# Patient Record
Sex: MALE | Race: WHITE | Employment: OTHER | URBAN - METROPOLITAN AREA
[De-identification: names, ages, dates, MRNs, and addresses within clinical notes are randomized per-mention and may not be internally consistent; named-entity substitution may affect disease eponyms.]

---

## 2021-03-03 ENCOUNTER — IMMUNIZATION (OUTPATIENT)
Dept: PRIMARY CARE CLINIC | Age: 71
End: 2021-03-03
Payer: MEDICARE

## 2021-03-03 PROCEDURE — 0001A COVID-19, PFIZER VACCINE 30MCG/0.3ML DOSE: CPT | Performed by: NURSE PRACTITIONER

## 2021-03-03 PROCEDURE — 91300 COVID-19, PFIZER VACCINE 30MCG/0.3ML DOSE: CPT | Performed by: NURSE PRACTITIONER

## 2021-03-29 ENCOUNTER — IMMUNIZATION (OUTPATIENT)
Dept: PRIMARY CARE CLINIC | Age: 71
End: 2021-03-29
Payer: MEDICARE

## 2021-03-29 PROCEDURE — 0002A COVID-19, PFIZER VACCINE 30MCG/0.3ML DOSE: CPT | Performed by: NURSE PRACTITIONER

## 2021-03-29 PROCEDURE — 91300 COVID-19, PFIZER VACCINE 30MCG/0.3ML DOSE: CPT | Performed by: NURSE PRACTITIONER

## 2022-03-11 RX ORDER — LOSARTAN POTASSIUM 100 MG/1
100 TABLET ORAL NIGHTLY
COMMUNITY

## 2022-03-16 ENCOUNTER — ANESTHESIA EVENT (OUTPATIENT)
Dept: OPERATING ROOM | Age: 72
End: 2022-03-16
Payer: MEDICARE

## 2022-03-16 ENCOUNTER — PREP FOR PROCEDURE (OUTPATIENT)
Dept: OPHTHALMOLOGY | Age: 72
End: 2022-03-16

## 2022-03-16 RX ORDER — SODIUM CHLORIDE 0.9 % (FLUSH) 0.9 %
10 SYRINGE (ML) INJECTION PRN
Status: CANCELLED | OUTPATIENT
Start: 2022-03-16

## 2022-03-16 RX ORDER — SODIUM CHLORIDE 9 MG/ML
25 INJECTION, SOLUTION INTRAVENOUS PRN
Status: CANCELLED | OUTPATIENT
Start: 2022-03-16

## 2022-03-16 RX ORDER — PROPARACAINE HYDROCHLORIDE 5 MG/ML
1 SOLUTION/ DROPS OPHTHALMIC SEE ADMIN INSTRUCTIONS
Status: CANCELLED | OUTPATIENT
Start: 2022-03-18

## 2022-03-16 RX ORDER — SODIUM CHLORIDE 9 MG/ML
INJECTION, SOLUTION INTRAVENOUS CONTINUOUS
Status: CANCELLED | OUTPATIENT
Start: 2022-03-16

## 2022-03-16 RX ORDER — PHENYLEPHRINE HCL 2.5 %
1 DROPS OPHTHALMIC (EYE) SEE ADMIN INSTRUCTIONS
Status: CANCELLED | OUTPATIENT
Start: 2022-03-18

## 2022-03-16 RX ORDER — SODIUM CHLORIDE 0.9 % (FLUSH) 0.9 %
10 SYRINGE (ML) INJECTION EVERY 12 HOURS SCHEDULED
Status: CANCELLED | OUTPATIENT
Start: 2022-03-16

## 2022-03-16 RX ORDER — TROPICAMIDE 10 MG/ML
1 SOLUTION/ DROPS OPHTHALMIC SEE ADMIN INSTRUCTIONS
Status: CANCELLED | OUTPATIENT
Start: 2022-03-18

## 2022-03-16 RX ORDER — OFLOXACIN 3 MG/ML
1 SOLUTION/ DROPS OPHTHALMIC ONCE
Status: CANCELLED | OUTPATIENT
Start: 2022-03-18

## 2022-03-17 ASSESSMENT — LIFESTYLE VARIABLES: SMOKING_STATUS: 0

## 2022-03-17 NOTE — ANESTHESIA PRE PROCEDURE
Department of Anesthesiology  Preprocedure Note       Name:  Nettie Faustin   Age:  70 y.o.  :  1950                                          MRN:  [id number]         Date:  3/17/2022      Surgeon: Iraida Lloyd):  Alexys Rebollar MD    Procedure: Procedure(s):  PARS PLANA VITRECTOMY, INTERNAL LIMITING MEMBRANE PEEL, INDOCYANNE GREEN, 25G, MAC, RIGHT EYE    Medications prior to admission:   Prior to Admission medications    Medication Sig Start Date End Date Taking? Authorizing Provider   losartan (COZAAR) 100 MG tablet Take 100 mg by mouth nightly   Yes Historical Provider, MD   Heather Root (HEATHER PO) Take by mouth OTC   Yes Historical Provider, MD   NONFORMULARY Beet powder -OTC   Yes Historical Provider, MD       Current medications:    No current facility-administered medications for this encounter. Current Outpatient Medications   Medication Sig Dispense Refill    losartan (COZAAR) 100 MG tablet Take 100 mg by mouth nightly      Heather Root (HEATHER PO) Take by mouth OTC      NONFORMULARY Beet powder -OTC         Allergies: Allergies   Allergen Reactions    Penicillins Hives       Problem List:  There is no problem list on file for this patient.       Past Medical History:        Diagnosis Date    Hyperlipidemia     diet control    Hypertension     Pneumatouria        Past Surgical History:        Procedure Laterality Date    BACK SURGERY      2014: rods- for spinal surgery       Social History:    Social History     Tobacco Use    Smoking status: Never Smoker    Smokeless tobacco: Never Used   Substance Use Topics    Alcohol use: Yes     Comment: occasionally- wine with supper                                Counseling given: Not Answered      Vital Signs (Current):   Vitals:    22 1333   Weight: 238 lb (108 kg)   Height: 6' (1.829 m)                                              BP Readings from Last 3 Encounters:   No data found for BP       NPO Status:  >8.H Stable on Zoloft 50mg daily; continue current medication and follow up in 6mths.   BMI:   Wt Readings from Last 3 Encounters:   No data found for Wt     Body mass index is 32.28 kg/m². CBC: No results found for: WBC, RBC, HGB, HCT, MCV, RDW, PLT    CMP: No results found for: NA, K, CL, CO2, BUN, CREATININE, GFRAA, AGRATIO, LABGLOM, GLUCOSE, GLU, PROT, CALCIUM, BILITOT, ALKPHOS, AST, ALT    POC Tests: No results for input(s): POCGLU, POCNA, POCK, POCCL, POCBUN, POCHEMO, POCHCT in the last 72 hours. Coags: No results found for: PROTIME, INR, APTT    HCG (If Applicable): No results found for: PREGTESTUR, PREGSERUM, HCG, HCGQUANT     ABGs: No results found for: PHART, PO2ART, FYZ4MFA, JTJ7DGA, BEART, S9MZDBZB     Type & Screen (If Applicable):  No results found for: LABABO, LABRH    Drug/Infectious Status (If Applicable):  No results found for: HIV, HEPCAB    COVID-19 Screening (If Applicable): No results found for: COVID19        Anesthesia Evaluation  Patient summary reviewed and Nursing notes reviewed no history of anesthetic complications:   Airway: Mallampati: III  TM distance: >3 FB     Mouth opening: > = 3 FB Dental: normal exam   (+) implants      Pulmonary: breath sounds clear to auscultation      (-) not a current smoker                          ROS comment: pneumaturia   Cardiovascular:  Exercise tolerance: good (>4 METS),   (+) hypertension:, hyperlipidemia        Rhythm: regular  Rate: normal           Beta Blocker:  Not on Beta Blocker         Neuro/Psych:   (+) neuromuscular disease (spinal rods):,             GI/Hepatic/Renal:             Endo/Other:                     Abdominal:         (-) obese       Vascular: Other Findings:           Anesthesia Plan      MAC     ASA 2       Induction: intravenous. continuous noninvasive hemodynamic monitor  MIPS: Prophylactic antiemetics administered. Anesthetic plan and risks discussed with patient. Plan discussed with CRNA.                 Sonido Mariano MD 3/17/2022

## 2022-03-18 ENCOUNTER — HOSPITAL ENCOUNTER (OUTPATIENT)
Age: 72
Setting detail: OUTPATIENT SURGERY
Discharge: HOME | End: 2022-03-18
Attending: OPHTHALMOLOGY | Admitting: OPHTHALMOLOGY
Payer: MEDICARE

## 2022-03-18 ENCOUNTER — ANESTHESIA (OUTPATIENT)
Dept: OPERATING ROOM | Age: 72
End: 2022-03-18
Payer: MEDICARE

## 2022-03-18 VITALS
HEIGHT: 72 IN | WEIGHT: 242 LBS | TEMPERATURE: 98 F | OXYGEN SATURATION: 95 % | BODY MASS INDEX: 32.78 KG/M2 | DIASTOLIC BLOOD PRESSURE: 90 MMHG | RESPIRATION RATE: 16 BRPM | SYSTOLIC BLOOD PRESSURE: 126 MMHG | HEART RATE: 84 BPM

## 2022-03-18 VITALS
OXYGEN SATURATION: 97 % | SYSTOLIC BLOOD PRESSURE: 139 MMHG | DIASTOLIC BLOOD PRESSURE: 80 MMHG | RESPIRATION RATE: 10 BRPM

## 2022-03-18 PROCEDURE — 3600000013 HC SURGERY LEVEL 3 ADDTL 15MIN: Performed by: OPHTHALMOLOGY

## 2022-03-18 PROCEDURE — 3700000000 HC ANESTHESIA ATTENDED CARE: Performed by: OPHTHALMOLOGY

## 2022-03-18 PROCEDURE — 7100000011 HC PHASE II RECOVERY - ADDTL 15 MIN: Performed by: OPHTHALMOLOGY

## 2022-03-18 PROCEDURE — 2500000003 HC RX 250 WO HCPCS: Performed by: PHYSICIAN ASSISTANT

## 2022-03-18 PROCEDURE — 6360000002 HC RX W HCPCS

## 2022-03-18 PROCEDURE — 7100000010 HC PHASE II RECOVERY - FIRST 15 MIN: Performed by: OPHTHALMOLOGY

## 2022-03-18 PROCEDURE — 6370000000 HC RX 637 (ALT 250 FOR IP): Performed by: OPHTHALMOLOGY

## 2022-03-18 PROCEDURE — 2500000003 HC RX 250 WO HCPCS: Performed by: OPHTHALMOLOGY

## 2022-03-18 PROCEDURE — 6370000000 HC RX 637 (ALT 250 FOR IP): Performed by: PHYSICIAN ASSISTANT

## 2022-03-18 PROCEDURE — 2709999900 HC NON-CHARGEABLE SUPPLY: Performed by: OPHTHALMOLOGY

## 2022-03-18 PROCEDURE — 2580000003 HC RX 258: Performed by: ANESTHESIOLOGY

## 2022-03-18 PROCEDURE — 3600000003 HC SURGERY LEVEL 3 BASE: Performed by: OPHTHALMOLOGY

## 2022-03-18 PROCEDURE — 2720000010 HC SURG SUPPLY STERILE: Performed by: OPHTHALMOLOGY

## 2022-03-18 PROCEDURE — 3700000001 HC ADD 15 MINUTES (ANESTHESIA): Performed by: OPHTHALMOLOGY

## 2022-03-18 RX ORDER — MIDAZOLAM HYDROCHLORIDE 1 MG/ML
INJECTION INTRAMUSCULAR; INTRAVENOUS PRN
Status: DISCONTINUED | OUTPATIENT
Start: 2022-03-18 | End: 2022-03-18 | Stop reason: SDUPTHER

## 2022-03-18 RX ORDER — PROPARACAINE HYDROCHLORIDE 5 MG/ML
1 SOLUTION/ DROPS OPHTHALMIC SEE ADMIN INSTRUCTIONS
Status: DISCONTINUED | OUTPATIENT
Start: 2022-03-18 | End: 2022-03-18 | Stop reason: HOSPADM

## 2022-03-18 RX ORDER — DIPHENHYDRAMINE HYDROCHLORIDE 50 MG/ML
12.5 INJECTION INTRAMUSCULAR; INTRAVENOUS
Status: DISCONTINUED | OUTPATIENT
Start: 2022-03-18 | End: 2022-03-18 | Stop reason: HOSPADM

## 2022-03-18 RX ORDER — MEPERIDINE HYDROCHLORIDE 25 MG/ML
12.5 INJECTION INTRAMUSCULAR; INTRAVENOUS; SUBCUTANEOUS EVERY 5 MIN PRN
Status: DISCONTINUED | OUTPATIENT
Start: 2022-03-18 | End: 2022-03-18 | Stop reason: HOSPADM

## 2022-03-18 RX ORDER — FENTANYL CITRATE 50 UG/ML
50 INJECTION, SOLUTION INTRAMUSCULAR; INTRAVENOUS EVERY 5 MIN PRN
Status: DISCONTINUED | OUTPATIENT
Start: 2022-03-18 | End: 2022-03-18 | Stop reason: HOSPADM

## 2022-03-18 RX ORDER — TETRACAINE HYDROCHLORIDE 5 MG/ML
SOLUTION OPHTHALMIC PRN
Status: DISCONTINUED | OUTPATIENT
Start: 2022-03-18 | End: 2022-03-18 | Stop reason: ALTCHOICE

## 2022-03-18 RX ORDER — PHENYLEPHRINE HCL 2.5 %
1 DROPS OPHTHALMIC (EYE) SEE ADMIN INSTRUCTIONS
Status: DISCONTINUED | OUTPATIENT
Start: 2022-03-18 | End: 2022-03-18 | Stop reason: HOSPADM

## 2022-03-18 RX ORDER — OFLOXACIN 3 MG/ML
1 SOLUTION/ DROPS OPHTHALMIC ONCE
Status: COMPLETED | OUTPATIENT
Start: 2022-03-18 | End: 2022-03-18

## 2022-03-18 RX ORDER — SODIUM CHLORIDE 0.9 % (FLUSH) 0.9 %
10 SYRINGE (ML) INJECTION PRN
Status: DISCONTINUED | OUTPATIENT
Start: 2022-03-18 | End: 2022-03-18 | Stop reason: HOSPADM

## 2022-03-18 RX ORDER — INDOCYANINE GREEN AND WATER 25 MG
KIT INJECTION PRN
Status: DISCONTINUED | OUTPATIENT
Start: 2022-03-18 | End: 2022-03-18 | Stop reason: ALTCHOICE

## 2022-03-18 RX ORDER — NEOMYCIN SULFATE, POLYMYXIN B SULFATE, AND DEXAMETHASONE 3.5; 10000; 1 MG/G; [USP'U]/G; MG/G
OINTMENT OPHTHALMIC PRN
Status: DISCONTINUED | OUTPATIENT
Start: 2022-03-18 | End: 2022-03-18 | Stop reason: ALTCHOICE

## 2022-03-18 RX ORDER — SODIUM CHLORIDE 0.9 % (FLUSH) 0.9 %
5-40 SYRINGE (ML) INJECTION EVERY 12 HOURS SCHEDULED
Status: DISCONTINUED | OUTPATIENT
Start: 2022-03-18 | End: 2022-03-18 | Stop reason: HOSPADM

## 2022-03-18 RX ORDER — TROPICAMIDE 10 MG/ML
1 SOLUTION/ DROPS OPHTHALMIC SEE ADMIN INSTRUCTIONS
Status: DISCONTINUED | OUTPATIENT
Start: 2022-03-18 | End: 2022-03-18 | Stop reason: HOSPADM

## 2022-03-18 RX ORDER — SODIUM CHLORIDE 0.9 % (FLUSH) 0.9 %
10 SYRINGE (ML) INJECTION EVERY 12 HOURS SCHEDULED
Status: DISCONTINUED | OUTPATIENT
Start: 2022-03-18 | End: 2022-03-18 | Stop reason: HOSPADM

## 2022-03-18 RX ORDER — SODIUM CHLORIDE 0.9 % (FLUSH) 0.9 %
5-40 SYRINGE (ML) INJECTION PRN
Status: DISCONTINUED | OUTPATIENT
Start: 2022-03-18 | End: 2022-03-18 | Stop reason: HOSPADM

## 2022-03-18 RX ORDER — SODIUM CHLORIDE 9 MG/ML
25 INJECTION, SOLUTION INTRAVENOUS PRN
Status: DISCONTINUED | OUTPATIENT
Start: 2022-03-18 | End: 2022-03-18 | Stop reason: HOSPADM

## 2022-03-18 RX ORDER — SODIUM CHLORIDE 9 MG/ML
INJECTION, SOLUTION INTRAVENOUS CONTINUOUS
Status: DISCONTINUED | OUTPATIENT
Start: 2022-03-18 | End: 2022-03-18 | Stop reason: HOSPADM

## 2022-03-18 RX ORDER — FENTANYL CITRATE 50 UG/ML
INJECTION, SOLUTION INTRAMUSCULAR; INTRAVENOUS PRN
Status: DISCONTINUED | OUTPATIENT
Start: 2022-03-18 | End: 2022-03-18 | Stop reason: SDUPTHER

## 2022-03-18 RX ORDER — SODIUM CHLORIDE, SODIUM LACTATE, POTASSIUM CHLORIDE, CALCIUM CHLORIDE 600; 310; 30; 20 MG/100ML; MG/100ML; MG/100ML; MG/100ML
INJECTION, SOLUTION INTRAVENOUS CONTINUOUS
Status: DISCONTINUED | OUTPATIENT
Start: 2022-03-18 | End: 2022-03-18 | Stop reason: HOSPADM

## 2022-03-18 RX ORDER — MORPHINE SULFATE 2 MG/ML
1 INJECTION, SOLUTION INTRAMUSCULAR; INTRAVENOUS EVERY 5 MIN PRN
Status: DISCONTINUED | OUTPATIENT
Start: 2022-03-18 | End: 2022-03-18 | Stop reason: HOSPADM

## 2022-03-18 RX ADMIN — TROPICAMIDE 1 DROP: 10 SOLUTION/ DROPS OPHTHALMIC at 09:43

## 2022-03-18 RX ADMIN — FENTANYL CITRATE 50 MCG: 50 INJECTION, SOLUTION INTRAMUSCULAR; INTRAVENOUS at 10:44

## 2022-03-18 RX ADMIN — PROPARACAINE HYDROCHLORIDE 1 DROP: 5 SOLUTION/ DROPS OPHTHALMIC at 09:43

## 2022-03-18 RX ADMIN — FENTANYL CITRATE 50 MCG: 50 INJECTION, SOLUTION INTRAMUSCULAR; INTRAVENOUS at 10:48

## 2022-03-18 RX ADMIN — SODIUM CHLORIDE, POTASSIUM CHLORIDE, SODIUM LACTATE AND CALCIUM CHLORIDE: 600; 310; 30; 20 INJECTION, SOLUTION INTRAVENOUS at 09:43

## 2022-03-18 RX ADMIN — MIDAZOLAM 1 MG: 1 INJECTION INTRAMUSCULAR; INTRAVENOUS at 10:44

## 2022-03-18 RX ADMIN — PHENYLEPHRINE HYDROCHLORIDE 1 DROP: 25 SOLUTION/ DROPS OPHTHALMIC at 09:43

## 2022-03-18 RX ADMIN — MIDAZOLAM 1 MG: 1 INJECTION INTRAMUSCULAR; INTRAVENOUS at 10:48

## 2022-03-18 RX ADMIN — OFLOXACIN 1 DROP: 3 SOLUTION OPHTHALMIC at 09:51

## 2022-03-18 ASSESSMENT — PAIN - FUNCTIONAL ASSESSMENT: PAIN_FUNCTIONAL_ASSESSMENT: 0-10

## 2022-03-18 ASSESSMENT — PULMONARY FUNCTION TESTS
PIF_VALUE: 1
PIF_VALUE: 1

## 2022-03-18 ASSESSMENT — PAIN SCALES - GENERAL: PAINLEVEL_OUTOF10: 0

## 2022-03-18 NOTE — H&P
I have examined the patient and reviewed the history and physical from 3/9/22  and find no relevant changes. I have reviewed with the patient and/or family the risks, benefits, and alternatives to the procedure and they have agreed to proceed.     Kade Lilly MD

## 2022-03-18 NOTE — ANESTHESIA POSTPROCEDURE EVALUATION
Department of Anesthesiology  Postprocedure Note    Patient: Yamileth Luevano  MRN: [id number]  YOB: 1950  Date of evaluation: 3/18/2022  Time:  1:14 PM     Procedure Summary     Date: 03/18/22 Room / Location: [address] [place] [address]    Anesthesia Start: 6294 Anesthesia Stop: 1114    Procedure: PARS PLANA VITRECTOMY, INTERNAL LIMITING MEMBRANE PEEL, INDOCYANNE GREEN, 25G, MAC, RIGHT EYE (Right Eye) Diagnosis: (H35.371)    Surgeons: Carol Vivar MD Responsible Provider: Mykel Crowe MD    Anesthesia Type: MAC ASA Status: 2          Anesthesia Type: MAC    Live Phase I: Live Score: 10    Live Phase II: Live Score: 10    Last vitals: Reviewed and per EMR flowsheets.        Anesthesia Post Evaluation    Patient location during evaluation: PACU  Patient participation: complete - patient participated  Level of consciousness: awake and alert  Airway patency: patent  Nausea & Vomiting: no nausea and no vomiting  Complications: no  Cardiovascular status: hemodynamically stable  Respiratory status: room air and spontaneous ventilation  Hydration status: stable

## 2022-03-18 NOTE — OP NOTE
for a postoperative check in the office.           Electronically signed by Baron Cabello MD on 3/18/2022 at 9:59 AM

## 2024-12-10 LAB
ALBUMIN: NORMAL
ALP BLD-CCNC: NORMAL U/L
ALT SERPL-CCNC: NORMAL U/L
ANION GAP SERPL CALCULATED.3IONS-SCNC: NORMAL MMOL/L
AST SERPL-CCNC: NORMAL U/L
BASOPHILS ABSOLUTE: NORMAL
BASOPHILS RELATIVE PERCENT: NORMAL
BILIRUB SERPL-MCNC: NORMAL MG/DL
BUN BLDV-MCNC: NORMAL MG/DL
CALCIUM SERPL-MCNC: NORMAL MG/DL
CHLORIDE BLD-SCNC: NORMAL MMOL/L
CHOLESTEROL, TOTAL: NORMAL
CHOLESTEROL/HDL RATIO: NORMAL
CO2: NORMAL
CREAT SERPL-MCNC: NORMAL MG/DL
EOSINOPHILS ABSOLUTE: NORMAL
EOSINOPHILS RELATIVE PERCENT: NORMAL
GFR, ESTIMATED: NORMAL
GLUCOSE BLD-MCNC: NORMAL MG/DL
HCT VFR BLD CALC: NORMAL %
HDLC SERPL-MCNC: NORMAL MG/DL
HEMOGLOBIN: NORMAL
LDL CHOLESTEROL: NORMAL
LYMPHOCYTES ABSOLUTE: NORMAL
LYMPHOCYTES RELATIVE PERCENT: NORMAL
MCH RBC QN AUTO: NORMAL PG
MCHC RBC AUTO-ENTMCNC: NORMAL G/DL
MCV RBC AUTO: NORMAL FL
MONOCYTES ABSOLUTE: NORMAL
MONOCYTES RELATIVE PERCENT: NORMAL
NEUTROPHILS ABSOLUTE: NORMAL
NEUTROPHILS RELATIVE PERCENT: NORMAL
NONHDLC SERPL-MCNC: NORMAL MG/DL
PLATELET # BLD: NORMAL 10*3/UL
PMV BLD AUTO: NORMAL FL
POTASSIUM SERPL-SCNC: NORMAL MMOL/L
PROSTATE SPECIFIC ANTIGEN: NORMAL
RBC # BLD: NORMAL 10*6/UL
SODIUM BLD-SCNC: NORMAL MMOL/L
TOTAL PROTEIN: NORMAL
TRIGL SERPL-MCNC: NORMAL MG/DL
VLDLC SERPL CALC-MCNC: NORMAL MG/DL
WBC # BLD: NORMAL 10*3/UL

## 2024-12-11 ENCOUNTER — TELEPHONE (OUTPATIENT)
Age: 74
End: 2024-12-11

## 2024-12-11 DIAGNOSIS — R97.20 ELEVATED PSA: Primary | ICD-10-CM

## 2024-12-31 SDOH — HEALTH STABILITY: PHYSICAL HEALTH
ON AVERAGE, HOW MANY DAYS PER WEEK DO YOU ENGAGE IN MODERATE TO STRENUOUS EXERCISE (LIKE A BRISK WALK)?: PATIENT DECLINED

## 2024-12-31 SDOH — ECONOMIC STABILITY: FOOD INSECURITY: WITHIN THE PAST 12 MONTHS, YOU WORRIED THAT YOUR FOOD WOULD RUN OUT BEFORE YOU GOT MONEY TO BUY MORE.: NEVER TRUE

## 2024-12-31 SDOH — ECONOMIC STABILITY: FOOD INSECURITY: WITHIN THE PAST 12 MONTHS, THE FOOD YOU BOUGHT JUST DIDN'T LAST AND YOU DIDN'T HAVE MONEY TO GET MORE.: NEVER TRUE

## 2024-12-31 SDOH — ECONOMIC STABILITY: INCOME INSECURITY: HOW HARD IS IT FOR YOU TO PAY FOR THE VERY BASICS LIKE FOOD, HOUSING, MEDICAL CARE, AND HEATING?: NOT HARD AT ALL

## 2024-12-31 SDOH — ECONOMIC STABILITY: TRANSPORTATION INSECURITY
IN THE PAST 12 MONTHS, HAS LACK OF TRANSPORTATION KEPT YOU FROM MEETINGS, WORK, OR FROM GETTING THINGS NEEDED FOR DAILY LIVING?: NO

## 2024-12-31 ASSESSMENT — PATIENT HEALTH QUESTIONNAIRE - PHQ9
1. LITTLE INTEREST OR PLEASURE IN DOING THINGS: NOT AT ALL
SUM OF ALL RESPONSES TO PHQ QUESTIONS 1-9: 0
SUM OF ALL RESPONSES TO PHQ QUESTIONS 1-9: 0
2. FEELING DOWN, DEPRESSED OR HOPELESS: NOT AT ALL
SUM OF ALL RESPONSES TO PHQ QUESTIONS 1-9: 0
SUM OF ALL RESPONSES TO PHQ QUESTIONS 1-9: 0
SUM OF ALL RESPONSES TO PHQ9 QUESTIONS 1 & 2: 0

## 2024-12-31 ASSESSMENT — LIFESTYLE VARIABLES
HOW OFTEN DO YOU HAVE A DRINK CONTAINING ALCOHOL: MONTHLY OR LESS
HOW OFTEN DO YOU HAVE A DRINK CONTAINING ALCOHOL: 2
HOW OFTEN DO YOU HAVE SIX OR MORE DRINKS ON ONE OCCASION: 1
HOW MANY STANDARD DRINKS CONTAINING ALCOHOL DO YOU HAVE ON A TYPICAL DAY: 1
HOW MANY STANDARD DRINKS CONTAINING ALCOHOL DO YOU HAVE ON A TYPICAL DAY: 1 OR 2

## 2025-01-03 ENCOUNTER — OFFICE VISIT (OUTPATIENT)
Age: 75
End: 2025-01-03

## 2025-01-03 VITALS
RESPIRATION RATE: 16 BRPM | DIASTOLIC BLOOD PRESSURE: 71 MMHG | HEART RATE: 88 BPM | HEIGHT: 72 IN | TEMPERATURE: 97.1 F | OXYGEN SATURATION: 95 % | WEIGHT: 242.2 LBS | SYSTOLIC BLOOD PRESSURE: 131 MMHG | BODY MASS INDEX: 32.8 KG/M2

## 2025-01-03 DIAGNOSIS — R97.20 ELEVATED PSA: ICD-10-CM

## 2025-01-03 DIAGNOSIS — I10 BENIGN HYPERTENSION: ICD-10-CM

## 2025-01-03 DIAGNOSIS — Z00.00 MEDICARE ANNUAL WELLNESS VISIT, SUBSEQUENT: Primary | ICD-10-CM

## 2025-01-03 DIAGNOSIS — C44.311 BASAL CELL CARCINOMA (BCC) OF LATERAL SIDE WALL OF NOSE: ICD-10-CM

## 2025-01-03 DIAGNOSIS — E78.2 MIXED HYPERLIPIDEMIA: ICD-10-CM

## 2025-01-03 RX ORDER — HYDROCHLOROTHIAZIDE 25 MG/1
25 TABLET ORAL DAILY
COMMUNITY
Start: 2024-12-09

## 2025-01-03 NOTE — PROGRESS NOTES
Medicare Annual Wellness Visit    Jake Bustillos is here for Medicare AWV (AM[facility] subsequent.  Pt states doing ok, no complaints.   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  ) and Hypertension (Recent labs  done 12/10/24 in [facility].  )    Assessment & Plan   Medicare annual wellness visit, subsequent  Benign hypertension  Mixed hyperlipidemia  Elevated PSA  Basal cell carcinoma (BCC) of lateral side wall of nose   In summary this patient is doing quite well we will keep an eye on his heart murmur on his next visit flu shot was recommended.  PSA and basic metabolic panel will be done on next visit hypertension is stable he is watching his diet very closely he will continue to see his dermatologist with his basal cell carcinoma of his  Return in 6 months (on 7/3/2025) for Medicare Annual Wellness Visit in 1 year.     Subjective     Patient comes in today for a wellness visit he is doing quite well.  He is having a basal cell carcinoma watched by his skin doctor on a regular basis.  He has some electrocautery done on a regular basis it is not a melanoma.  The patient just saw his urologist Dr. House his PSA was elevated 4.16 he is not really overly concerned but just get a repeat a PSA when I see him in 6 months.  The only noticed today on his exam which was stable he does have a new heart murmur about a grade 2/6 have not heard this before he has never had to see a cardiologist we will keep an eye on this if it further develops we will do an echocardiogram of his heart he is asymptomatic.  I did ask him to get a flu shot here in the next week at the pharmacy they are still readily available and flu is on the rise as his COVID.  He otherwise has no complaints he is lost 4 pounds since his last visit he is compliant with his medications his labs are very stable other than a sugar of 113 he will keep watching his sweets he went over his breakfast and lunch diet he is doing very very well with that very healthy

## 2025-03-26 ENCOUNTER — HOSPITAL ENCOUNTER (OUTPATIENT)
Age: 75
Discharge: HOME | End: 2025-03-28

## 2025-04-02 LAB — SURGICAL PATHOLOGY REPORT: NORMAL

## 2025-05-05 RX ORDER — HYDROCHLOROTHIAZIDE 25 MG/1
25 TABLET ORAL DAILY
Qty: 90 TABLET | Refills: 3 | Status: SHIPPED | OUTPATIENT
Start: 2025-05-05

## 2025-05-05 RX ORDER — HYDROCHLOROTHIAZIDE 25 MG/1
25 TABLET ORAL DAILY
Qty: 90 TABLET | Refills: 3 | Status: CANCELLED | OUTPATIENT
Start: 2025-05-05

## 2025-05-05 NOTE — TELEPHONE ENCOUNTER
Name of Medication(s) Requested:  Requested Prescriptions     Pending Prescriptions Disp Refills    hydroCHLOROthiazide (HYDRODIURIL) 25 MG tablet 90 tablet 3     Sig: Take 1 tablet by mouth daily       Medication is on current medication list Yes    Dosage and directions were verified? Yes    Quantity verified: 90 day supply     Pharmacy Verified?  Yes    Last Appointment:  1/3/2025    Future appts:  Future Appointments   Date Time Provider Department Center   7/8/2025  8:15 AM Shay Amador, DO [facility] DEP   1/9/2026  8:00 AM Shay Amador, DO [facility] DEP        (If no appt send self scheduling link. .REFILLAPPT)  Scheduling request sent?     [] Yes  [x] No    Does patient need updated?  [] Yes  [x] No

## 2025-05-20 LAB — SURGICAL PATHOLOGY REPORT: NORMAL

## 2025-06-02 ENCOUNTER — TELEPHONE (OUTPATIENT)
Age: 75
End: 2025-06-02

## 2025-06-02 NOTE — TELEPHONE ENCOUNTER
Patient called because he has gotten back into biking recently and has been having issues with his heart rate \"being all over the place\" and in staying hydrated. He takes Hydrochlorothiazide and sweats a lot when biking. He can't drink enough to keep hydrated and his electrolytes up. His heart rate has been  jumping around from 60 to 130 back to 70 etc.    Yesterday when he has just sitting ( he had not biked yesterday but had on Saturday)  his heart rate started doing the same thing. He drank some electrolytes and after awhile it got better. Please advise.    Uses [facility] in [place]  Allergic to Penicillins

## 2025-06-03 SDOH — ECONOMIC STABILITY: INCOME INSECURITY: IN THE LAST 12 MONTHS, WAS THERE A TIME WHEN YOU WERE NOT ABLE TO PAY THE MORTGAGE OR RENT ON TIME?: NO

## 2025-06-03 SDOH — ECONOMIC STABILITY: FOOD INSECURITY: WITHIN THE PAST 12 MONTHS, YOU WORRIED THAT YOUR FOOD WOULD RUN OUT BEFORE YOU GOT MONEY TO BUY MORE.: NEVER TRUE

## 2025-06-03 SDOH — ECONOMIC STABILITY: FOOD INSECURITY: WITHIN THE PAST 12 MONTHS, THE FOOD YOU BOUGHT JUST DIDN'T LAST AND YOU DIDN'T HAVE MONEY TO GET MORE.: NEVER TRUE

## 2025-06-03 SDOH — ECONOMIC STABILITY: TRANSPORTATION INSECURITY
IN THE PAST 12 MONTHS, HAS THE LACK OF TRANSPORTATION KEPT YOU FROM MEDICAL APPOINTMENTS OR FROM GETTING MEDICATIONS?: NO

## 2025-06-06 ENCOUNTER — OFFICE VISIT (OUTPATIENT)
Age: 75
End: 2025-06-06

## 2025-06-06 VITALS
DIASTOLIC BLOOD PRESSURE: 80 MMHG | BODY MASS INDEX: 33.62 KG/M2 | WEIGHT: 248.2 LBS | TEMPERATURE: 97.5 F | HEART RATE: 54 BPM | HEIGHT: 72 IN | RESPIRATION RATE: 16 BRPM | OXYGEN SATURATION: 95 % | SYSTOLIC BLOOD PRESSURE: 132 MMHG

## 2025-06-06 DIAGNOSIS — R00.2 HEART PALPITATIONS: Primary | ICD-10-CM

## 2025-06-06 DIAGNOSIS — I10 BENIGN HYPERTENSION: ICD-10-CM

## 2025-06-06 DIAGNOSIS — E78.2 MIXED HYPERLIPIDEMIA: ICD-10-CM

## 2025-06-06 DIAGNOSIS — I49.1 PREMATURE ATRIAL CONTRACTIONS: ICD-10-CM

## 2025-06-06 DIAGNOSIS — R00.2 PALPITATIONS: ICD-10-CM

## 2025-06-06 RX ORDER — CALCIUM CARBONATE/VITAMIN D3 600 MG-10
TABLET ORAL
COMMUNITY

## 2025-06-06 RX ORDER — PERPHENAZINE/AMITRIPTYLINE HCL 4 MG-25 MG
TABLET ORAL
COMMUNITY

## 2025-06-06 ASSESSMENT — PATIENT HEALTH QUESTIONNAIRE - PHQ9
SUM OF ALL RESPONSES TO PHQ QUESTIONS 1-9: 0
1. LITTLE INTEREST OR PLEASURE IN DOING THINGS: NOT AT ALL
2. FEELING DOWN, DEPRESSED OR HOPELESS: NOT AT ALL

## 2025-06-06 NOTE — PROGRESS NOTES
Jake Bustillos (:  1950) is a 75 y.o. male,Established patient, here for evaluation of the following chief complaint(s):  No chief complaint on file.         Assessment & Plan  Benign hypertension   Chronic, at goal (stable), continue current treatment plan, medication adherence emphasized, and lifestyle modifications recommended  Continue current dose of hydrochlorothiazide  Orders:    EKG 12 Lead    Mixed hyperlipidemia   Chronic, at goal (stable), changes made today: Continue current dose of omega 3 caps in his vitamin supplements smoothies in the         Heart palpitations   New, at goal (stable), changes made today: See below  Cardiology consultation will be made electrophysiology  Orders:    EKG 12 Lead    Magnesium; Future    oBnnie Winston MD, Electrophysiology, [facility]    Premature atrial contractions   New, at goal (stable), changes made today: Electrophysiology    Orders:    Magnesium; Future    Extended cardiac holter monitor (3 days - 15 days); Future    Bonnie Winston MD, Electrophysiology[facility]    Palpitations    As above    Orders:    Magnesium; Future    Extended cardiac holter monitor (3 days - 15 days); Future    Bonnie Winston MD, Electrophysiology, [place]      No follow-ups on file.     In summary we will set him up with electrophysiologist will add a magnesium level I do not think medicine changes need to be done he is already drinking an adequate amount of water and electrolytes and his vitamin smoothies in the morning certainly have a multitude of electrolytes and minerals and vitamins in them.  He will keep his visit in July I will call him with his labs before he heads off to [place] for his bicycling next weekend.  Again if he should go to the ER only if he should develop further symptoms with these  Subjective   HPI:  Patient comes in today very stable currently healthy is a 75-year-old male with his longstanding history of hypertension clinically

## 2025-06-06 NOTE — ASSESSMENT & PLAN NOTE
Chronic, at goal (stable), continue current treatment plan, medication adherence emphasized, and lifestyle modifications recommended  Continue current dose of hydrochlorothiazide  Orders:    EKG 12 Lead

## 2025-06-06 NOTE — ASSESSMENT & PLAN NOTE
Chronic, at goal (stable), changes made today: Continue current dose of omega 3 caps in his vitamin supplements smoothies in the

## 2025-06-09 ENCOUNTER — RESULTS FOLLOW-UP (OUTPATIENT)
Age: 75
End: 2025-06-09

## 2025-06-09 DIAGNOSIS — I49.1 PREMATURE ATRIAL CONTRACTIONS: ICD-10-CM

## 2025-06-09 DIAGNOSIS — R00.2 HEART PALPITATIONS: ICD-10-CM

## 2025-06-09 DIAGNOSIS — R00.2 PALPITATIONS: ICD-10-CM

## 2025-06-09 LAB — MAGNESIUM: 2.1 MG/DL (ref 1.6–2.4)

## 2025-06-20 ENCOUNTER — TELEPHONE (OUTPATIENT)
Age: 75
End: 2025-06-20

## 2025-06-20 DIAGNOSIS — R00.2 HEART PALPITATIONS: Primary | ICD-10-CM

## 2025-06-20 DIAGNOSIS — I49.1 PREMATURE ATRIAL CONTRACTIONS: ICD-10-CM

## 2025-06-20 NOTE — TELEPHONE ENCOUNTER
Arcelia from Dr. Crews's office called to ask for you to refer patient to a cardiologist for the time being. It does not appear he has one currently. She said they cannot get patient in until the end of the year and don't want him to be without care. She asked for us to call her back to let her know who you refer patient to so she can find out what they do for him. They will still schedule him.    Her phone is [phone]

## 2025-07-08 ENCOUNTER — OFFICE VISIT (OUTPATIENT)
Age: 75
End: 2025-07-08
Payer: MEDICARE

## 2025-07-08 VITALS
OXYGEN SATURATION: 96 % | DIASTOLIC BLOOD PRESSURE: 79 MMHG | TEMPERATURE: 97.6 F | WEIGHT: 250 LBS | HEART RATE: 91 BPM | RESPIRATION RATE: 16 BRPM | HEIGHT: 72 IN | SYSTOLIC BLOOD PRESSURE: 133 MMHG | BODY MASS INDEX: 33.86 KG/M2

## 2025-07-08 DIAGNOSIS — R61 SWEATING INCREASE: ICD-10-CM

## 2025-07-08 DIAGNOSIS — I10 BENIGN HYPERTENSION: Primary | ICD-10-CM

## 2025-07-08 DIAGNOSIS — E78.2 MIXED HYPERLIPIDEMIA: ICD-10-CM

## 2025-07-08 PROCEDURE — 99213 OFFICE O/P EST LOW 20 MIN: CPT | Performed by: FAMILY MEDICINE

## 2025-07-08 PROCEDURE — G8427 DOCREV CUR MEDS BY ELIG CLIN: HCPCS | Performed by: FAMILY MEDICINE

## 2025-07-08 PROCEDURE — 3075F SYST BP GE 130 - 139MM HG: CPT | Performed by: FAMILY MEDICINE

## 2025-07-08 PROCEDURE — G8417 CALC BMI ABV UP PARAM F/U: HCPCS | Performed by: FAMILY MEDICINE

## 2025-07-08 PROCEDURE — 3078F DIAST BP <80 MM HG: CPT | Performed by: FAMILY MEDICINE

## 2025-07-08 PROCEDURE — 1036F TOBACCO NON-USER: CPT | Performed by: FAMILY MEDICINE

## 2025-07-08 PROCEDURE — 1159F MED LIST DOCD IN RCRD: CPT | Performed by: FAMILY MEDICINE

## 2025-07-08 PROCEDURE — 1123F ACP DISCUSS/DSCN MKR DOCD: CPT | Performed by: FAMILY MEDICINE

## 2025-07-08 PROCEDURE — 3017F COLORECTAL CA SCREEN DOC REV: CPT | Performed by: FAMILY MEDICINE

## 2025-07-08 PROCEDURE — 1126F AMNT PAIN NOTED NONE PRSNT: CPT | Performed by: FAMILY MEDICINE

## 2025-07-08 PROCEDURE — 1160F RVW MEDS BY RX/DR IN RCRD: CPT | Performed by: FAMILY MEDICINE

## 2025-07-08 NOTE — PROGRESS NOTES
Jake Bustillos (:  1950) is a 75 y.o. male,Established patient, here for evaluation of the following chief complaint(s):  Hypertension (Pt here for 6 month f/u.  No recent labs done.  ) and Hyperlipidemia (Pt states doing good, no current complaints.  )         Assessment & Plan  Benign hypertension   Chronic, at goal (stable), continue current treatment plan, medication adherence emphasized, and lifestyle modifications recommended    Orders:    Comprehensive Metabolic Panel, Fasting; Future    CBC with Auto Differential; Future    Lipid, Fasting; Future    Mixed hyperlipidemia   Chronic, at goal (stable), continue current treatment plan, medication adherence emphasized, and lifestyle modifications recommended    Orders:    Comprehensive Metabolic Panel, Fasting; Future    CBC with Auto Differential; Future    Lipid, Fasting; Future    Sweating increase   He has complaine of increased sweating recently     Orders:    TSH; Future      No follow-ups on file.     Health maintenance issues are up-to-date flu shot will be given in the fall who get of RSV shot as an outpatient since he is over 75 labs to be done prior to next visit he is compliant with his meds he will continue dietary and exercise measures and follow-up with his urologist.  6-month follow-up will be given  Subjective   HPI:  Patient comes in today for 6-month follow-up hypertension dyslipidemia.  Doing quite well.  Labs will be done prior to next visit he is following with urologist for his prostate-specific antigen.  Immunizations are up-to-date I gave him a slip that he is able to get an RSV vaccine at a later date at the pharmacy blood pressure is 133/79 he is compliant with his medications.  All of his other immunizations are up-to-date he will get a flu shot in the fall blood work we done prior to next visit we talked about his balance issue this is a chronic issue therapy has not made that much of a difference things he does at home that

## 2025-07-31 DIAGNOSIS — I10 BENIGN HYPERTENSION: Primary | ICD-10-CM

## 2025-07-31 RX ORDER — HYDROCHLOROTHIAZIDE 25 MG/1
25 TABLET ORAL DAILY
Qty: 90 TABLET | Refills: 3 | Status: SHIPPED | OUTPATIENT
Start: 2025-07-31

## 2025-07-31 NOTE — TELEPHONE ENCOUNTER
Name of Medication(s) Requested:  Requested Prescriptions     Pending Prescriptions Disp Refills    hydroCHLOROthiazide (HYDRODIURIL) 25 MG tablet 90 tablet 3     Sig: Take 1 tablet by mouth daily       Medication is on current medication list Yes    Dosage and directions were verified? Yes    Quantity verified: 90 day supply     Pharmacy Verified?  Yes    Last Appointment:  7/8/2025    Future appts:  Future Appointments   Date Time Provider Department Center   10/8/2025  9:00 AM Alayna Howell MD ELECTRO PHYS [facility]   1/9/2026  8:00 AM Shay Amador DO [facility] DEP        (If no appt send self scheduling link. .REFILLAPPT)  Scheduling request sent?     [] Yes  [x] No    Does patient need updated?  [] Yes  [x] No

## 2025-08-12 ENCOUNTER — OFFICE VISIT (OUTPATIENT)
Age: 75
End: 2025-08-12
Payer: MEDICARE

## 2025-08-12 VITALS
HEART RATE: 83 BPM | BODY MASS INDEX: 32.78 KG/M2 | OXYGEN SATURATION: 95 % | RESPIRATION RATE: 16 BRPM | WEIGHT: 242 LBS | HEIGHT: 72 IN | DIASTOLIC BLOOD PRESSURE: 82 MMHG | TEMPERATURE: 97.5 F | SYSTOLIC BLOOD PRESSURE: 133 MMHG

## 2025-08-12 DIAGNOSIS — I10 BENIGN HYPERTENSION: ICD-10-CM

## 2025-08-12 DIAGNOSIS — J01.00 ACUTE NON-RECURRENT MAXILLARY SINUSITIS: Primary | ICD-10-CM

## 2025-08-12 PROCEDURE — 1036F TOBACCO NON-USER: CPT | Performed by: FAMILY MEDICINE

## 2025-08-12 PROCEDURE — 3075F SYST BP GE 130 - 139MM HG: CPT | Performed by: FAMILY MEDICINE

## 2025-08-12 PROCEDURE — 1159F MED LIST DOCD IN RCRD: CPT | Performed by: FAMILY MEDICINE

## 2025-08-12 PROCEDURE — 1160F RVW MEDS BY RX/DR IN RCRD: CPT | Performed by: FAMILY MEDICINE

## 2025-08-12 PROCEDURE — 1126F AMNT PAIN NOTED NONE PRSNT: CPT | Performed by: FAMILY MEDICINE

## 2025-08-12 PROCEDURE — G8417 CALC BMI ABV UP PARAM F/U: HCPCS | Performed by: FAMILY MEDICINE

## 2025-08-12 PROCEDURE — 99214 OFFICE O/P EST MOD 30 MIN: CPT | Performed by: FAMILY MEDICINE

## 2025-08-12 PROCEDURE — G8427 DOCREV CUR MEDS BY ELIG CLIN: HCPCS | Performed by: FAMILY MEDICINE

## 2025-08-12 PROCEDURE — 3017F COLORECTAL CA SCREEN DOC REV: CPT | Performed by: FAMILY MEDICINE

## 2025-08-12 PROCEDURE — 1123F ACP DISCUSS/DSCN MKR DOCD: CPT | Performed by: FAMILY MEDICINE

## 2025-08-12 PROCEDURE — 3079F DIAST BP 80-89 MM HG: CPT | Performed by: FAMILY MEDICINE

## 2025-08-12 RX ORDER — TRIAMCINOLONE ACETONIDE 55 UG/1
2 SPRAY, METERED NASAL DAILY
Qty: 1 EACH | Refills: 2 | Status: SHIPPED | OUTPATIENT
Start: 2025-08-12

## 2025-08-12 RX ORDER — DOXYCYCLINE HYCLATE 100 MG
100 TABLET ORAL 2 TIMES DAILY
Qty: 20 TABLET | Refills: 1 | Status: SHIPPED | OUTPATIENT
Start: 2025-08-12 | End: 2025-09-01

## (undated) DEVICE — STANDARD HYPODERMIC NEEDLE,POLYPROPYLENE HUB: Brand: MONOJECT

## (undated) DEVICE — 3M™ TRANSPORE™ WHITE SURGICAL TAPE 1534-1, 1 INCH X 10 YARD (2,5CM X 9,1M), 12 ROLLS/CARTON 10 CARTONS/CASE: Brand: 3M™ TRANSPORE™

## (undated) DEVICE — SYRINGE, LUER LOCK, 10ML: Brand: MEDLINE

## (undated) DEVICE — NEEDLE SYR 18GA L1.5IN RED PLAS HUB S STL BLNT FILL W/O

## (undated) DEVICE — LENS VITRCTMY FLAT DISP

## (undated) DEVICE — PACK PROCEDURE SURG RETINAL ST ES CUST

## (undated) DEVICE — REVOLUTION DSP 25G ILM FORCEPS: Brand: ALCON GRIESHABER REVOLUTION

## (undated) DEVICE — GLOVE SURG SZ 8 L12IN FNGR THK94MIL STD WHT LTX FREE

## (undated) DEVICE — VITRECTOMY PACK 25 GA INPUT

## (undated) DEVICE — 1060 S-DRAPE SPCL INCISE 10/BX 4BX/CS: Brand: STERI-DRAPE™

## (undated) DEVICE — STOPCOCK IV PRIMING 0.26ML 3 W W/ TWO FEM LUERLOK PRT 1